# Patient Record
Sex: MALE | URBAN - METROPOLITAN AREA
[De-identification: names, ages, dates, MRNs, and addresses within clinical notes are randomized per-mention and may not be internally consistent; named-entity substitution may affect disease eponyms.]

---

## 2022-12-22 ENCOUNTER — OFFICE VISIT (OUTPATIENT)
Facility: LOCATION | Age: 62
End: 2022-12-22
Payer: COMMERCIAL

## 2022-12-22 DIAGNOSIS — H25.13 AGE-RELATED NUCLEAR CATARACT, BILATERAL: ICD-10-CM

## 2022-12-22 DIAGNOSIS — H35.363 DRUSEN (DEGENERATIVE) OF MACULA, BILATERAL: ICD-10-CM

## 2022-12-22 DIAGNOSIS — H40.031 ANATOMICAL NARROW ANGLE, RIGHT EYE: Primary | ICD-10-CM

## 2022-12-22 DIAGNOSIS — H04.123 TEAR FILM INSUFFICIENCY OF BILATERAL LACRIMAL GLANDS: ICD-10-CM

## 2022-12-22 PROCEDURE — 99204 OFFICE O/P NEW MOD 45 MIN: CPT | Performed by: OPTOMETRIST

## 2022-12-22 PROCEDURE — 92134 CPTRZ OPH DX IMG PST SGM RTA: CPT | Performed by: OPTOMETRIST

## 2022-12-22 PROCEDURE — 92020 GONIOSCOPY: CPT | Performed by: OPTOMETRIST

## 2022-12-22 PROCEDURE — 92250 FUNDUS PHOTOGRAPHY W/I&R: CPT | Performed by: OPTOMETRIST

## 2022-12-22 ASSESSMENT — VISUAL ACUITY
OD: 20/40
OS: 20/40

## 2022-12-22 ASSESSMENT — INTRAOCULAR PRESSURE
OD: 19
OS: 18

## 2022-12-22 ASSESSMENT — KERATOMETRY
OS: 42.75
OD: 43.13

## 2022-12-22 NOTE — IMPRESSION/PLAN
Impression: Drusen (degenerative) of macula, bilateral: H35.363. Plan: Seen ON OCT . Mild drusen noted during today's exam. Patient advised to  refrain from tobacco use, eat a healthy diet full of antioxidants and wear ocular UV protection when outside or in the car. Monitor Amsler grid for changes.

## 2022-12-22 NOTE — IMPRESSION/PLAN
Impression: Anatomical narrow angle, right eye: H40.031.
- Need ASAP LPI eval due to potential elevation in retina noted on Optos with ASAP DFE following LPI Plan: Narrow angles, intraocular pressure stable but patient at increased risk of narrow angle glaucoma. Recommend peripheral iridectomy evaluation. Will refer to glaucoma specialist. Patient advised of signs and symptoms of angle closure and to call office immediately of go to ER if they occur.

## 2022-12-27 ENCOUNTER — OFFICE VISIT (OUTPATIENT)
Facility: LOCATION | Age: 62
End: 2022-12-27
Payer: COMMERCIAL

## 2022-12-27 DIAGNOSIS — H40.2232 CHRONIC ANGLE-CLOSURE GLAUCOMA, BILATERAL, MODERATE STAGE: Primary | ICD-10-CM

## 2022-12-27 DIAGNOSIS — H40.031 ANATOMICAL NARROW ANGLE, RIGHT EYE: ICD-10-CM

## 2022-12-27 PROCEDURE — 99204 OFFICE O/P NEW MOD 45 MIN: CPT | Performed by: STUDENT IN AN ORGANIZED HEALTH CARE EDUCATION/TRAINING PROGRAM

## 2022-12-27 PROCEDURE — 92083 EXTENDED VISUAL FIELD XM: CPT | Performed by: STUDENT IN AN ORGANIZED HEALTH CARE EDUCATION/TRAINING PROGRAM

## 2022-12-27 PROCEDURE — 92133 CPTRZD OPH DX IMG PST SGM ON: CPT | Performed by: STUDENT IN AN ORGANIZED HEALTH CARE EDUCATION/TRAINING PROGRAM

## 2022-12-27 PROCEDURE — 92020 GONIOSCOPY: CPT | Performed by: STUDENT IN AN ORGANIZED HEALTH CARE EDUCATION/TRAINING PROGRAM

## 2022-12-27 RX ORDER — PREDNISOLONE ACETATE 10 MG/ML
1 % SUSPENSION/ DROPS OPHTHALMIC
Qty: 5 | Refills: 1 | Status: ACTIVE
Start: 2022-12-27

## 2022-12-27 ASSESSMENT — KERATOMETRY
OD: 43.38
OS: 42.38

## 2022-12-27 ASSESSMENT — INTRAOCULAR PRESSURE
OD: 19
OS: 20

## 2022-12-27 NOTE — IMPRESSION/PLAN
Impression: Anatomical narrow angle, right eye: H40.031. Plan: The patient was warned of signs and symptoms of angle closure glaucoma and need for immediate follow-up. Discussed risks/benefits of laser peripheral iridotomy treatment. There is a possibility of need for additional sessions to complete the LPI. Discussed that LPI does not lower pressure nor does it improve vision. If patient is on eye pressure reducing medications now, patient will still need to use them after LPI. A ghost image or line in field of vision may be more evident in the bright light conditions. This usually resolves overtime. Also discussed possible need for further intervention with Pilocarpine, iridoplasty, and/or phaco +/- ECP if angles do not open enough after LPI. Patient is to avoid medication with glaucoma warnings. All questions answered, patient wishes to proceed. See Cedric Serna to schedule LPI OD  Followed by post-laser visit with Gonioscopy and IOP check 2 weeks after lasers. ERx'd Prednisolone : to be used TID x5 days then stop.

## 2022-12-27 NOTE — IMPRESSION/PLAN
Impression: Chronic angle-closure glaucoma, bilateral, moderate stage: R08.1619. Plan: Ocular Surgical History: none Pachy:  pending. Tmax: 19 / 20 Target IOP: _ teens OU Med Allergies: none Heart / Lung / Kidney disease/sulfa allergy: Pt. denies Gonioscopy: OD grade 0-1, OS grade 3 w/ 2+ TMP
OCT: 91 Sup thin / 77 sup thin HVF: MD - 13 OD, MD - 18 OS, peripheral constriction OU. (got dizzy during field) C/D: 0.5/0.75 Better seeing eye:  OD  /  OS
IOP Today: 19/20 Plan: IOP today is elevated OU, but primary concern is angle closure OD. Rec LPI OD ASAP Discussed natural history of glaucoma and that irreversible vision loss can occur without adequate IOP control. Emphasized compliance with eyedrops and other treatment described above.

## 2022-12-28 ENCOUNTER — SURGERY (OUTPATIENT)
Dept: URBAN - METROPOLITAN AREA SURGERY 5 | Facility: SURGERY | Age: 62
End: 2022-12-28
Payer: COMMERCIAL

## 2022-12-28 PROCEDURE — 66761 REVISION OF IRIS: CPT | Performed by: STUDENT IN AN ORGANIZED HEALTH CARE EDUCATION/TRAINING PROGRAM

## 2023-01-19 ENCOUNTER — POST-OPERATIVE VISIT (OUTPATIENT)
Facility: LOCATION | Age: 63
End: 2023-01-19
Payer: COMMERCIAL

## 2023-01-19 DIAGNOSIS — H40.031 ANATOMICAL NARROW ANGLE, RIGHT EYE: ICD-10-CM

## 2023-01-19 DIAGNOSIS — Z48.810 ENCOUNTER FOR SURGICAL AFTERCARE FOLLOWING SURGERY ON A SENSE ORGAN: Primary | ICD-10-CM

## 2023-01-19 PROCEDURE — 92020 GONIOSCOPY: CPT | Performed by: OPTOMETRIST

## 2023-01-19 PROCEDURE — 99024 POSTOP FOLLOW-UP VISIT: CPT | Performed by: OPTOMETRIST

## 2023-01-19 ASSESSMENT — INTRAOCULAR PRESSURE
OD: 14
OS: 16

## 2023-01-19 NOTE — IMPRESSION/PLAN
Impression: S/P LPI (Laser Peripheral Iridotomy) OD - 22 Days. Encounter for surgical aftercare following surgery on a sense organ  Z48.810. Plan: Patient is healing well . Mild  Inflammation remains. Restart Pred QID x7 days then stop.

## 2023-01-26 ENCOUNTER — POST-OPERATIVE VISIT (OUTPATIENT)
Facility: LOCATION | Age: 63
End: 2023-01-26
Payer: COMMERCIAL

## 2023-01-26 DIAGNOSIS — Z48.810 ENCOUNTER FOR SURGICAL AFTERCARE FOLLOWING SURGERY ON A SENSE ORGAN: Primary | ICD-10-CM

## 2023-01-26 DIAGNOSIS — H40.031 ANATOMICAL NARROW ANGLE, RIGHT EYE: ICD-10-CM

## 2023-01-26 PROCEDURE — 92020 GONIOSCOPY: CPT | Performed by: OPTOMETRIST

## 2023-01-26 PROCEDURE — 99024 POSTOP FOLLOW-UP VISIT: CPT | Performed by: OPTOMETRIST

## 2023-01-26 ASSESSMENT — INTRAOCULAR PRESSURE
OD: 16
OS: 16

## 2023-01-26 NOTE — IMPRESSION/PLAN
Impression: S/P LPI (Laser Peripheral Iridotomy) OD - 29 Days. Encounter for surgical aftercare following surgery on a sense organ  Z48.810. Plan: AC shallow post LPI OD, LPI Patent. Patient is using Flonase and Benadryl due to breathing difficulties. Advised patient to DC and follow up with PCP to switch medication.  Refer back to glaucoma specialist. DO NOT DILATE **

NA Dr. Lary uY